# Patient Record
Sex: MALE | Race: WHITE | Employment: UNEMPLOYED | URBAN - METROPOLITAN AREA
[De-identification: names, ages, dates, MRNs, and addresses within clinical notes are randomized per-mention and may not be internally consistent; named-entity substitution may affect disease eponyms.]

---
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
## 2023-12-06 ENCOUNTER — TELEPHONE (OUTPATIENT)
Dept: PEDIATRICS | Facility: CLINIC | Age: 23
End: 2023-12-06
Payer: COMMERCIAL

## 2023-12-06 NOTE — TELEPHONE ENCOUNTER
----- Message from Curt Stroud MD sent at 11/30/2023  3:10 PM EST -----  Regarding: schedule Well Adult Visit  Let PATIENT know that:      Based on our records, PATIENT is quite  behind on their  medical care and/or on immunizations.  If PATIENT  is followed by another provider, let us know and we will update our records.    Otherwise, we want PATIENT to be scheduled for a Well Adult VIsit such as soon as possible.     If unable to reach by phone / portal, please send letter

## 2024-01-29 ENCOUNTER — TELEPHONE (OUTPATIENT)
Dept: PEDIATRICS | Facility: CLINIC | Age: 24
End: 2024-01-29
Payer: COMMERCIAL

## 2024-01-29 NOTE — TELEPHONE ENCOUNTER
----- Message from Curt Stroud MD sent at 1/22/2024 10:38 AM EST -----  Regarding: schedule Well Adult Visit  Let PATIENT know that they are due for Well Adult Care.    Based on our records, HE is quite  behind on their medical care and/or on immunizations.  If HE is followed by another provider, let us know and we will update our records.    Otherwise, please schedule a Well Visit as soon as possible.     If unable to reach by phone / portal, please send letter

## 2024-03-07 ENCOUNTER — TELEPHONE (OUTPATIENT)
Dept: PEDIATRICS | Facility: CLINIC | Age: 24
End: 2024-03-07
Payer: COMMERCIAL

## 2024-03-07 NOTE — TELEPHONE ENCOUNTER
----- Message from Curt Stroud MD sent at 3/4/2024 10:04 AM EST -----  Regarding: schedule Well Adult Visit  Let PATIENT know that they are due for Well Adult Care.    Based on our records, HE is quite  behind on their medical care and/or on immunizations.  If HE is followed by another provider, let us know and we will update our records.    Otherwise, please schedule a Well Visit as soon as possible.     If unable to reach by phone / portal, please send letter

## 2024-05-14 ENCOUNTER — TELEPHONE (OUTPATIENT)
Dept: PEDIATRICS | Facility: CLINIC | Age: 24
End: 2024-05-14
Payer: COMMERCIAL

## 2024-05-14 NOTE — TELEPHONE ENCOUNTER
----- Message from Curt Stroud MD sent at 4/29/2024  2:31 PM EDT -----  Regarding: schedule Well Adult Visit  Let PATIENT know that they are due for Well Adult Care.    Please schedule such as soon as possible.     If unable to reach by phone / portal, please send letter

## 2024-09-04 ENCOUNTER — APPOINTMENT (OUTPATIENT)
Dept: PEDIATRICS | Facility: CLINIC | Age: 24
End: 2024-09-04
Payer: COMMERCIAL

## 2024-09-04 VITALS
TEMPERATURE: 97.7 F | WEIGHT: 136.4 LBS | HEIGHT: 70 IN | HEART RATE: 62 BPM | SYSTOLIC BLOOD PRESSURE: 118 MMHG | BODY MASS INDEX: 19.53 KG/M2 | RESPIRATION RATE: 12 BRPM | DIASTOLIC BLOOD PRESSURE: 68 MMHG

## 2024-09-04 DIAGNOSIS — Z13.0 ENCOUNTER FOR SCREENING FOR HEMATOLOGIC DISORDER: ICD-10-CM

## 2024-09-04 DIAGNOSIS — Z28.39 NOT UP TO DATE WITH DIPHTHERIA-TETANUS VACCINATION: ICD-10-CM

## 2024-09-04 DIAGNOSIS — H54.7 VISUAL ACUITY REDUCED: ICD-10-CM

## 2024-09-04 DIAGNOSIS — Z28.39 HEPATITIS B VACCINATION NOT UP TO DATE: ICD-10-CM

## 2024-09-04 DIAGNOSIS — Z00.00 WELL ADULT EXAM: Primary | ICD-10-CM

## 2024-09-04 DIAGNOSIS — Z11.9 SCREENING EXAMINATION FOR INFECTIOUS DISEASE: ICD-10-CM

## 2024-09-04 DIAGNOSIS — Z28.39: ICD-10-CM

## 2024-09-04 DIAGNOSIS — D22.5 ATYPICAL NEVUS OF ABDOMINAL WALL: ICD-10-CM

## 2024-09-04 DIAGNOSIS — Z13.31 DEPRESSION SCREEN: ICD-10-CM

## 2024-09-04 DIAGNOSIS — Z11.3 SCREEN FOR STD (SEXUALLY TRANSMITTED DISEASE): ICD-10-CM

## 2024-09-04 DIAGNOSIS — Z11.9 ENCOUNTER FOR SCREENING EXAMINATION FOR INFECTIOUS DISEASE: ICD-10-CM

## 2024-09-04 PROBLEM — Z13.220 ENCOUNTER FOR LIPID SCREENING FOR CARDIOVASCULAR DISEASE: Status: ACTIVE | Noted: 2024-09-04

## 2024-09-04 PROBLEM — Z13.6 ENCOUNTER FOR LIPID SCREENING FOR CARDIOVASCULAR DISEASE: Status: ACTIVE | Noted: 2024-09-04

## 2024-09-04 PROBLEM — E55.9 VITAMIN D DEFICIENCY: Status: ACTIVE | Noted: 2024-09-04

## 2024-09-04 LAB
POC APPEARANCE, URINE: CLEAR
POC BILIRUBIN, URINE: NEGATIVE
POC BLOOD, URINE: NEGATIVE
POC COLOR, URINE: ABNORMAL
POC GLUCOSE, URINE: NEGATIVE MG/DL
POC HEMOGLOBIN: 14.2 G/DL (ref 13.5–17.5)
POC KETONES, URINE: NEGATIVE MG/DL
POC LEUKOCYTES, URINE: NEGATIVE
POC NITRITE,URINE: NEGATIVE
POC PH, URINE: 7 PH
POC PROTEIN, URINE: NEGATIVE MG/DL
POC SPECIFIC GRAVITY, URINE: 1.02
POC UROBILINOGEN, URINE: 4 EU/DL

## 2024-09-04 PROCEDURE — 90632 HEPA VACCINE ADULT IM: CPT | Performed by: PEDIATRICS

## 2024-09-04 PROCEDURE — 90713 POLIOVIRUS IPV SC/IM: CPT | Performed by: PEDIATRICS

## 2024-09-04 PROCEDURE — 87591 N.GONORRHOEAE DNA AMP PROB: CPT

## 2024-09-04 PROCEDURE — 1036F TOBACCO NON-USER: CPT | Performed by: PEDIATRICS

## 2024-09-04 PROCEDURE — 90710 MMRV VACCINE SC: CPT | Performed by: PEDIATRICS

## 2024-09-04 PROCEDURE — 87661 TRICHOMONAS VAGINALIS AMPLIF: CPT

## 2024-09-04 PROCEDURE — 3008F BODY MASS INDEX DOCD: CPT | Performed by: PEDIATRICS

## 2024-09-04 PROCEDURE — 85018 HEMOGLOBIN: CPT | Performed by: PEDIATRICS

## 2024-09-04 PROCEDURE — 90471 IMMUNIZATION ADMIN: CPT | Performed by: PEDIATRICS

## 2024-09-04 PROCEDURE — 90715 TDAP VACCINE 7 YRS/> IM: CPT | Performed by: PEDIATRICS

## 2024-09-04 PROCEDURE — 90739 HEPB VACC 2/4 DOSE ADULT IM: CPT | Performed by: PEDIATRICS

## 2024-09-04 PROCEDURE — 81002 URINALYSIS NONAUTO W/O SCOPE: CPT | Performed by: PEDIATRICS

## 2024-09-04 PROCEDURE — 96127 BRIEF EMOTIONAL/BEHAV ASSMT: CPT | Performed by: PEDIATRICS

## 2024-09-04 PROCEDURE — 99385 PREV VISIT NEW AGE 18-39: CPT | Performed by: PEDIATRICS

## 2024-09-04 PROCEDURE — 87491 CHLMYD TRACH DNA AMP PROBE: CPT

## 2024-09-04 PROCEDURE — 90656 IIV3 VACC NO PRSV 0.5 ML IM: CPT | Performed by: PEDIATRICS

## 2024-09-04 PROCEDURE — 90472 IMMUNIZATION ADMIN EACH ADD: CPT | Performed by: PEDIATRICS

## 2024-09-04 NOTE — PROGRESS NOTES
Patient ID: Wilder Luna is a 24 y.o. male who presents for Well Child (24 year [facility]).  Today he is accompanied by his brother    The patient denies all TB risk factors (Specifically, padmnii nt denies that there has not been exposure to any of the following:  a homeless individual; a previously incarcerated individual; an immigrant from [place], [place], [place], [place], or [place]; an individual who is institutionalized; an individual who lives in a nursing home; an individual known to be infected with HIV; an individual known to be infected with TB.  The patientdenies having traveled to [place], [place], [place], [place], or [place]    Mental Health:  A screening questionnaire for depression was negative.      Tobacco:  Denies use  Alcohol:  Denies use  Drugs:  Denies use  Sexual activity:  Two lifetime sexual partners.    Safety concerns:  Denies being the victim of harassment, bullying, gang involvement.  Denies current or past history of abuse (physical, sexual, verbal, or emotional)    Diet:  The patient was advised to consume 3 servings of green vegetables per day (if not, adherence with a MVI was stressed).  The patient was advised to consume a minimum of 2 servings of meat per week (if not, adherence with a MVI was stressed).  The patient was advised to assure 1000 mg of Calcium and 1000 IU's of Vitamin D per day.  Discussion of supplementing with Caltrate-D was advised is dairy product consumption is not sufficient.  All concerns and questions regarding diet, nutrition, and eating habits were addressed.     Elimination:  The patient denies concerns regarding chronic constipation or diarrhea.  Voiding:  The patient denies concerns regarding urination or urinary symptoms.  Sleep:  The patient denies concerns regarding sleep; specifically there are no issues regarding the patients ability to fall asleep, stay asleep, or sleep throughout the night.    HOME LIFE:  There are no  "concerns with regards to the patient's relationships at home.    School:   Graduated from High School  Employed:  Post service  Sports:   none  Activities:   none    SOCIAL DETERMINANTS OF HEALTH:    Within the past 12 months, have you worried that your food would run out before you got money to buy more?  No  Within the past 12 months, the food you bought just did not last and you did not have money to get more?  No      No current outpatient medications on file.    Past Medical History:   Diagnosis Date    Other conditions influencing health status 2020    Birth of     Other conditions influencing health status 2020    No prior hospitalizations       Past Surgical History:   Procedure Laterality Date    OTHER SURGICAL HISTORY  2020    No history of surgery       No family history on file.    Social History     Tobacco Use    Smoking status: Never     Passive exposure: Never    Smokeless tobacco: Never   Vaping Use    Vaping status: Never Used       Objective   /68   Pulse 62   Temp 36.5 °C (97.7 °F) (Temporal)   Resp 12   Ht 1.778 m (5' 10\")   Wt 61.9 kg (136 lb 6.4 oz)   BMI 19.57 kg/m²   BSA: 1.75 meters squared        BMI: Body mass index is 19.57 kg/m².   Growth percentiles: Height:  Facility age limit for growth %jorje is 20 years.   Weight:  Facility age limit for growth %jorje is 20 years.  BMI:  Facility age limit for growth %jorje is 20 years.    PHYSICAL EXAM  General  General Appearance - Not in acute distress, Not Irritable, Not Lethargic / Slow.  Mental Status - Alert.  Build & Nutrition - Well developed and Well nourished.  Hydration - Well hydrated.    Integumentary  - - warm and dry with no rashes, normal skin turgor and scalp and hair without rash, or lesion.    Head and Neck  - - normalocephalic, neck supple, thyroid normal size and consistancy and no lymphadenopathy.  Head    Fontanelles and Sutures: Anterior [place] - Characteristics - closed. Posterior " [place] - Characteristics - closed.  Neck  Global Assessment - full range of motion, non-tender, No lymphadenopathy, no nucchal rigidty, no torticollis.  Trachea - midline.    Eye  - - Bilateral - pupils equal and round (No strabismus), sclera clear and lids pink without edema or mass.  Fundi - Bilateral - Normal.    ENMT  - - Bilateral - TM pearly grey with good light reflex, external auditory canal pink and dry, nasopharynx moist and pink and oropharynx moist and pink, tonsils normal, uvula midline .  Ears  Pinna - Bilateral - no generalized tenderness observed. External Auditory Canal - Bilateral - no edema noted in EAC, no drainage observed.  Mouth and Throat  Oral Cavity/Oropharynx - Hard Palate - no asymmetry observed, no erythema noted. Soft Palate - no asymmetry noted, no erythema noted. Oral Mucosa - moist.    Chest and Lung Exam  - - Bilateral - clear to auscultation, normal breathing effort and no chest deformity.  Inspection  Movements - Normal and Symmetrical. Accessory muscles - No use of accessory muscles in breathing.    Breast  - - Bilateral - symmetry, no mass palpable, no skin change and no nipple discharge.    Cardiovascular  - - regular rate and rhythm and no murmur, rub, or thrill.    Abdomen  - - soft, nontender, normal bowel sounds and no hepatomegaly, splenomegaly, or mass.  Inspection  Inspection of the abdomen reveals - No Abnormal pulsations, No Paradoxical movements and No Hernias. Skin - Inspection of the skin of the abdomen reveals - No Stria and No Ecchymoses.  Palpation/Percussion  Palpation and Percussion of the abdomen reveal - Soft, Non Tender, No Rebound tenderness, No Rigidity (guarding), No Abnormal dullness to percussion, No Abnormal tympany to percussion, No hepatosplenomegaly, No Palpable abdominal masses and No Subcutaneous crepitus.  Auscultation  Auscultation of the abdomen reveals - Bowel sounds normal, No Abdominal bruits and No Venous hums.    Male Genitourinary  -  - Bilateral - normal circumcised phallus, testicle smooth, round, and normal size and no palpable inguinal hernia.  Evaluation of genitourinary system reveals - scrotum non-tender, no masses, normal testes, no palpable masses, urethral meatus normal, no discharge, normal penis and normal anus and perineum, no lesions.  Sexual Maturity  [place] 5 - Adult hair pattern, Adult penile size and shape and Adult testicular size and shape.    Peripheral Vascular  - - Bilateral - peripheral pulses palpable in upper and lower extremity and no edema present.  Upper Extremity  Inspection - Bilateral - No Cyanotic nailbeds, No Delayed capillary refill, no Digital clubbing, No Erythema, Not Pale, No Petechiae. Palpation - Temperature - Bilateral - Normal.  Lower Extremity  Inspection - Bilateral - No Cyanotic nailbeds, No Delayed capillary refill, No Erythema, Not Pale. Palpation - Temperature - Bilateral - Normal.    Neurologic  - - normal sensation, cranial nerves II-XII intact and deep tendon reflexes normal.  Neurologic evaluation reveals  - normal sensation, normal coordination and upper and lower extremity deep tendon reflexes intact bilaterally .  Mental Status  Affect - normal. Speech - Normal. Thought content/perception - Normal. Cognitive function - Normal.  Cranial Nerves  III Oculomotor - Pupillary constriction - Bilateral - Normal. Eye Movements - Nystagmus - Bilateral - None.  Overall Assessment of Muscle Strength and Tone reveals  Upper Extremities - Right Deltoid - 5/5. Left Deltoid - 5/5. Right Bicep - 5/5. Left Bicep - 5/5. Right Tricep - 5/5. Left Tricep - 5/5. Right Intrinsics - 5/5. Left Intrinsics - 5/5. Lower Extremities - Right Iliopsoas - 5/5. Left Iliopsoas - 5/5. Right Quadriceps - 5/5. Left Quadriceps - 5/5. Right Hamstrings - 5/5. Left Hamstrings - 5/5. Right Tibialis Anterior - 5/5. Left Tibialis Anterior - 5/5. Right Gastroc-Soleus - 5/5. Left Gastroc-Soleus - 5/5.  Meningeal Signs -  None.    Musculoskeletal  - - normal posture, normal gait and station, Head and neck are symmetric, no deformities, masses or tenderness, Head and neck show normal ROM without pain or weakness, Spine shows normal curvatures full ROM without pain or weakness, Upper extremities show normal ROM without pain or weakness, Lower extremities show full ROM without pain or weakness and Patient is able to heel walk, toe walk, and duck walk.  Lower Extremity  Hip - Examination of the right hip reveals - no instability, subluxation or laxity. Examination of the left hip reveals - no instability, subluxation or laxity.    Lymphatic  - - Bilateral - no lymphadenopathy.        Assessment/Plan   Problem List Items Addressed This Visit       Atypical nevus of abdominal wall    Relevant Orders    Referral to Dermatology    Well adult exam - Primary    Relevant Orders    POCT UA (nonautomated) manually resulted (Completed)    Hearing screen (Completed)     Other Visit Diagnoses       Depression screen        Relevant Orders    Staff Communication: PHQ-9, ACT (Completed)    Encounter for screening for hematologic disorder        Relevant Orders    POCT hemoglobin manually resulted (Completed)    Screen for STD (sexually transmitted disease)        Relevant Orders    Syphilis Screen with Reflex    Hepatitis C Antibody    HIV 1/2 Antigen/Antibody Screen with Reflex to Confirmation    C. Trachomatis / N. Gonorrhoeae, Amplified Detection    Encounter for screening examination for infectious disease        Relevant Orders    HSV1 IgG and HSV2 IgG    Screening examination for infectious disease        Relevant Orders    Trichomonas vaginalis, Nucleic Acid Detection            Report:   Distortion product evoked otoacoustic emissions limited evaluation (to confirm the presence or absence of hearing disorder, at 2, 3, 4 and 5 kHz for each ear) were obtained.    interpretation:   Normal hearing      ANTICPIATORY GUIDANCE:  The following topics  were discussed:   use of alcohol, tobacco, and drugs with discussion of health risks; acedemics with discussion of acedemic performance, extra-curricular activities, career planning; dental care and encouragment of biannual dental cleanings; fire safety; firearm safety; water safety; bullying and harassement; safe home and school environments; history of past or current abuse; helmet safety for all at risk recreational and competetive activities; sex including use of condoms, STD testing.  car safety and use of seatbelts.  Discussed importance of maintaining physical activity.      Curt Stroud MD

## 2024-09-05 ENCOUNTER — TELEPHONE (OUTPATIENT)
Dept: PEDIATRICS | Facility: CLINIC | Age: 24
End: 2024-09-05
Payer: COMMERCIAL

## 2024-09-05 PROBLEM — E55.9 VITAMIN D DEFICIENCY: Status: RESOLVED | Noted: 2024-09-04 | Resolved: 2024-09-05

## 2024-09-05 LAB
C TRACH RRNA SPEC QL NAA+PROBE: NEGATIVE
N GONORRHOEA DNA SPEC QL PROBE+SIG AMP: NEGATIVE
T VAGINALIS RRNA SPEC QL NAA+PROBE: NEGATIVE

## 2024-09-05 NOTE — TELEPHONE ENCOUNTER
----- Message from Curt Stroud sent at 9/5/2024 12:05 PM EDT -----  Let PATIENT know  Gonnorhea was negative;  chlamydia was negative;  Trichomonas was negative;